# Patient Record
Sex: MALE | Race: WHITE | Employment: FULL TIME | URBAN - METROPOLITAN AREA
[De-identification: names, ages, dates, MRNs, and addresses within clinical notes are randomized per-mention and may not be internally consistent; named-entity substitution may affect disease eponyms.]

---

## 2017-02-23 ENCOUNTER — OFFICE VISIT (OUTPATIENT)
Dept: FAMILY MEDICINE | Facility: CLINIC | Age: 27
End: 2017-02-23
Payer: COMMERCIAL

## 2017-02-23 VITALS
SYSTOLIC BLOOD PRESSURE: 118 MMHG | DIASTOLIC BLOOD PRESSURE: 64 MMHG | OXYGEN SATURATION: 98 % | HEIGHT: 69 IN | HEART RATE: 98 BPM | BODY MASS INDEX: 34.33 KG/M2 | TEMPERATURE: 97.4 F | WEIGHT: 231.8 LBS

## 2017-02-23 DIAGNOSIS — R53.83 FATIGUE, UNSPECIFIED TYPE: ICD-10-CM

## 2017-02-23 DIAGNOSIS — G89.29 CHRONIC PAIN OF BOTH KNEES: Primary | ICD-10-CM

## 2017-02-23 DIAGNOSIS — M25.561 CHRONIC PAIN OF BOTH KNEES: Primary | ICD-10-CM

## 2017-02-23 DIAGNOSIS — M25.562 CHRONIC PAIN OF BOTH KNEES: Primary | ICD-10-CM

## 2017-02-23 DIAGNOSIS — E66.811 OBESITY (BMI 30.0-34.9): ICD-10-CM

## 2017-02-23 PROCEDURE — 99204 OFFICE O/P NEW MOD 45 MIN: CPT | Performed by: INTERNAL MEDICINE

## 2017-02-23 PROCEDURE — 84443 ASSAY THYROID STIM HORMONE: CPT | Performed by: INTERNAL MEDICINE

## 2017-02-23 PROCEDURE — 36415 COLL VENOUS BLD VENIPUNCTURE: CPT | Performed by: INTERNAL MEDICINE

## 2017-02-23 RX ORDER — ALBUTEROL SULFATE 0.83 MG/ML
1 SOLUTION RESPIRATORY (INHALATION) EVERY 6 HOURS PRN
COMMUNITY

## 2017-02-23 RX ORDER — FLUTICASONE PROPIONATE 110 UG/1
1 AEROSOL, METERED RESPIRATORY (INHALATION) 2 TIMES DAILY
COMMUNITY

## 2017-02-23 ASSESSMENT — ANXIETY QUESTIONNAIRES
1. FEELING NERVOUS, ANXIOUS, OR ON EDGE: NOT AT ALL
2. NOT BEING ABLE TO STOP OR CONTROL WORRYING: NOT AT ALL
IF YOU CHECKED OFF ANY PROBLEMS ON THIS QUESTIONNAIRE, HOW DIFFICULT HAVE THESE PROBLEMS MADE IT FOR YOU TO DO YOUR WORK, TAKE CARE OF THINGS AT HOME, OR GET ALONG WITH OTHER PEOPLE: SOMEWHAT DIFFICULT
GAD7 TOTAL SCORE: 4
5. BEING SO RESTLESS THAT IT IS HARD TO SIT STILL: NOT AT ALL
7. FEELING AFRAID AS IF SOMETHING AWFUL MIGHT HAPPEN: NOT AT ALL
6. BECOMING EASILY ANNOYED OR IRRITABLE: SEVERAL DAYS
3. WORRYING TOO MUCH ABOUT DIFFERENT THINGS: SEVERAL DAYS

## 2017-02-23 ASSESSMENT — PATIENT HEALTH QUESTIONNAIRE - PHQ9: 5. POOR APPETITE OR OVEREATING: MORE THAN HALF THE DAYS

## 2017-02-23 NOTE — PROGRESS NOTES
"  SUBJECTIVE:                                                    Jovanni Browne is a 26 year old male who presents to clinic today for the following health issues:    Jovanni is here to establish care.  He was in the [employer] for 5 years, discharge in July.  He currently works for [employer].  He lives with his girlfriend.  He has several concerns today.        Bilateral knee pain x 3 years   -- hurts most days out of the week  -- R>L  -- both swell up, click and pop. Sometimes feel like they are going to give out. Right knee locks up.    -- getting worse, but also has been more active lately   -- he has had a full evaluation including bilateral MRIs and orthopedics evaluation.  Apparently the MRI showed an abnormal plica in the right knee but no pathology in the left knee   -- has done physical therapy which did not help   -- not taking any medications for them now.      Hyperhydrosis   -- using 2 rx strength antiperspirants. Just started last week.  They seem to be helping but his skin is a little irritated.     Wondering about thyroid issues  -- always had issues with his weight   -- feeling tired all the time   -- running a few miles every week and lifting weights, has \"totally changed\" his diet but not losing any weight   -- not sure if he feels depressed, but does feel overwhelmed   -- feeling hungrier than usual, trying to eat more frequent small meals rather than one large meal   -- not sleeping well, gets up early, difficulty falling asleep.    -- no screen time before dinner.     PMH - asthma, well controlled on Flovent   PSH - wrist surgery to remove cyst   FH - brother with schizophrenia   Meds - flovent 110mcg 1 puff BID, albuterol prn  ALL - PCN severe allergy in his grandmother, told he might die if he got penicillin but has never taken it      ROS:  Constitutional, cardiovascular, pulmonary, GI, musculoskeletal, neuro, skin, endocrine and psych systems were reviewed. Positive for intermittent sharp " "twinges of chest wall pain. Otherwise negative unless noted above.      OBJECTIVE:                                                    /64 (BP Location: Right arm, Patient Position: Chair, Cuff Size: Adult Regular)  Pulse 98  Temp 97.4  F (36.3  C) (Tympanic)  Ht 5' 8.75\" (1.746 m)  Wt 231 lb 12.8 oz (105.1 kg)  SpO2 98%  BMI 34.48 kg/m2  Body mass index is 34.48 kg/(m^2).    Gen: well appearing, pleasant young man, no distress  HEENT: PERRL, sclera nonicteric, MMM  Neck: supple, no LAD. thyroid normal to palpation.   Pulm: breathing comfortably, CTAB, no wheezes or rales  CV: RRR, normal S1 and S2, no murmurs  Abd: BS present, soft, nontender, nondistended  Ext: 2+ distal pulses, no LE edema  MSK: knees grossly normal to inspection - no swelling or malalignment. R knee tender to palpation along medial joint line and medial femoral condyle. Full ROM, no effusion appreciated. No ligamentous instability.  R knee tender to palpation along medial joint line. Full ROM, no effusion appreciated. No ligamentous instability      Diagnostic Test Results:  none      ASSESSMENT/PLAN:                                                        1. Chronic pain of both knees  - SPORTS MEDICINE REFERRAL  - can try aleve or ibuprofen as needed for pain     2. Fatigue, unspecified type  - TSH with free T4 reflex - okay to leave voicemail with lab results     3. 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  We will start with above plan, and I will see him back in 2 months or so.         Mary Lou Elias MD  [facility] [place]      "

## 2017-02-23 NOTE — MR AVS SNAPSHOT
After Visit Summary   2/23/2017    Jovanni Browne    MRN: [id number]           Patient Information     Date Of Birth          1990        Visit Information        Provider Department      2/23/2017 2:00 PM Mary Lou Elias MD [facility] [place]        Today's Diagnoses     Chronic pain of both knees    -  1    Fatigue, unspecified type          Care Instructions    Sports medicine referral for the knees. You can take ibuprofen 600-800mg three times daily as needed or Aleve (naproxen) 1-2 tabs twice daily as needed for pain.              Follow-ups after your visit        Additional Services     SPORTS MEDICINE REFERRAL       Your provider has referred you to:  [facility]: [facility] [place] [phone]   [email or web address]    Please be aware that coverage of these services is subject to the terms and limitations of your health insurance plan.  Call member services at your health plan with any benefit or coverage questions.      Please bring the following to your appointment:    >>   Any x-rays, CTs or MRIs which have been performed.  Contact the facility where they were done to arrange for  prior to your scheduled appointment.    >>   List of current medications   >>   This referral request   >>   Any documents/labs given to you for this referral                  Who to contact     If you have questions or need follow up information about today's clinic visit or your schedule please contact [facility] directly at [phone].  Normal or non-critical lab and imaging results will be communicated to you by MyChart, letter or phone within 4 business days after the clinic has received the results. If you do not hear from us within 7 days, please contact the clinic through MyChart or phone. If you have a critical or abnormal lab result, we will notify  "you by phone as soon as possible.  Submit refill requests through [employer] or call your pharmacy and they will forward the refill request to us. Please allow 3 business days for your refill to be completed.          Additional Information About Your Visit        [employer] Information     [employer] lets you send messages to your doctor, view your test results, renew your prescriptions, schedule appointments and more. To sign up, go to [email or web address] . Click on \"Log in\" on the left side of the screen, which will take you to the Welcome page. Then click on \"Sign up Now\" on the right side of the page.     You will be asked to enter the access code listed below, as well as some personal information. Please follow the directions to create your username and password.     Your access code is: [email or web address]  Expires: 2017  2:34 PM     Your access code will  in 90 days. If you need help or a new code, please call your [place] clinic or [phone].        Care EveryWhere ID     This is your Care EveryWhere ID. This could be used by other organizations to access your [facility] medical records  [id number]        Your Vitals Were     Pulse Temperature Height Pulse Oximetry BMI (Body Mass Index)       98 97.4  F (36.3  C) (Tympanic) 5' 8.75\" (1.746 m) 98% 34.48 kg/m2        Blood Pressure from Last 3 Encounters:   17 118/64    Weight from Last 3 Encounters:   17 231 lb 12.8 oz (105.1 kg)              We Performed the Following     SPORTS MEDICINE REFERRAL     TSH with free T4 reflex        Primary Care Provider    None Specified       No primary provider on file.        Thank you!     Thank you for choosing [facility] [place]  for your care. Our goal is always to provide you with excellent care. Hearing back from our patients is one way we can continue to improve our services. Please take a few minutes to complete the written survey that you may receive in the mail after your visit with us. " Thank you!             Your Updated Medication List - Protect others around you: Learn how to safely use, store and throw away your medicines at www.disposemymeds.org.          This list is accurate as of: 2/23/17  2:34 PM.  Always use your most recent med list.                   Brand Name Dispense Instructions for use    albuterol (2.5 MG/3ML) 0.083% neb solution      Take 1 vial by nebulization every 6 hours as needed for shortness of breath / dyspnea or wheezing       fluticasone 110 MCG/ACT Inhaler    FLOVENT HFA     Inhale 1 puff into the lungs 2 times daily

## 2017-02-23 NOTE — PATIENT INSTRUCTIONS
Sports medicine referral for the knees. You can take ibuprofen 600-800mg three times daily as needed or Aleve (naproxen) 1-2 tabs twice daily as needed for pain.

## 2017-02-24 PROBLEM — M25.561 CHRONIC PAIN OF BOTH KNEES: Status: ACTIVE | Noted: 2017-02-24

## 2017-02-24 PROBLEM — G89.29 CHRONIC PAIN OF BOTH KNEES: Status: ACTIVE | Noted: 2017-02-24

## 2017-02-24 PROBLEM — M25.562 CHRONIC PAIN OF BOTH KNEES: Status: ACTIVE | Noted: 2017-02-24

## 2017-02-24 PROBLEM — E66.811 OBESITY (BMI 30.0-34.9): Status: ACTIVE | Noted: 2017-02-24

## 2017-02-24 LAB — TSH SERPL DL<=0.005 MIU/L-ACNC: 3.08 MU/L (ref 0.4–4)

## 2017-02-24 ASSESSMENT — PATIENT HEALTH QUESTIONNAIRE - PHQ9: SUM OF ALL RESPONSES TO PHQ QUESTIONS 1-9: 7

## 2017-02-24 ASSESSMENT — ANXIETY QUESTIONNAIRES: GAD7 TOTAL SCORE: 4

## 2017-02-24 ASSESSMENT — ASTHMA QUESTIONNAIRES: ACT_TOTALSCORE: 22

## 2021-10-20 ENCOUNTER — HOSPITAL ENCOUNTER (EMERGENCY)
Facility: CLINIC | Age: 31
Discharge: HOME | End: 2021-10-20
Attending: EMERGENCY MEDICINE | Admitting: EMERGENCY MEDICINE
Payer: COMMERCIAL

## 2021-10-20 VITALS
HEIGHT: 69 IN | DIASTOLIC BLOOD PRESSURE: 67 MMHG | TEMPERATURE: 99.4 F | OXYGEN SATURATION: 99 % | RESPIRATION RATE: 13 BRPM | WEIGHT: 175 LBS | BODY MASS INDEX: 25.92 KG/M2 | HEART RATE: 78 BPM | SYSTOLIC BLOOD PRESSURE: 113 MMHG

## 2021-10-20 DIAGNOSIS — L03.114 CELLULITIS OF LEFT UPPER EXTREMITY: ICD-10-CM

## 2021-10-20 DIAGNOSIS — R55 SYNCOPE, UNSPECIFIED SYNCOPE TYPE: ICD-10-CM

## 2021-10-20 DIAGNOSIS — I45.10 INCOMPLETE RBBB: ICD-10-CM

## 2021-10-20 LAB
ANION GAP SERPL CALCULATED.3IONS-SCNC: 7 MMOL/L (ref 3–14)
BASOPHILS # BLD AUTO: 0 10E3/UL (ref 0–0.2)
BASOPHILS NFR BLD AUTO: 0 %
BUN SERPL-MCNC: 10 MG/DL (ref 7–30)
CALCIUM SERPL-MCNC: 8.3 MG/DL (ref 8.5–10.1)
CHLORIDE BLD-SCNC: 103 MMOL/L (ref 94–109)
CO2 SERPL-SCNC: 26 MMOL/L (ref 20–32)
CREAT SERPL-MCNC: 0.87 MG/DL (ref 0.66–1.25)
EOSINOPHIL # BLD AUTO: 0.1 10E3/UL (ref 0–0.7)
EOSINOPHIL NFR BLD AUTO: 1 %
ERYTHROCYTE [DISTWIDTH] IN BLOOD BY AUTOMATED COUNT: 11.9 % (ref 10–15)
GFR SERPL CREATININE-BSD FRML MDRD: >90 ML/MIN/1.73M2
GLUCOSE BLD-MCNC: 103 MG/DL (ref 70–99)
HCT VFR BLD AUTO: 38.5 % (ref 40–53)
HGB BLD-MCNC: 13.1 G/DL (ref 13.3–17.7)
HOLD SPECIMEN: NORMAL
IMM GRANULOCYTES # BLD: 0 10E3/UL
IMM GRANULOCYTES NFR BLD: 0 %
LYMPHOCYTES # BLD AUTO: 2 10E3/UL (ref 0.8–5.3)
LYMPHOCYTES NFR BLD AUTO: 18 %
MCH RBC QN AUTO: 30.7 PG (ref 26.5–33)
MCHC RBC AUTO-ENTMCNC: 34 G/DL (ref 31.5–36.5)
MCV RBC AUTO: 90 FL (ref 78–100)
MONOCYTES # BLD AUTO: 1.2 10E3/UL (ref 0–1.3)
MONOCYTES NFR BLD AUTO: 11 %
NEUTROPHILS # BLD AUTO: 7.5 10E3/UL (ref 1.6–8.3)
NEUTROPHILS NFR BLD AUTO: 70 %
NRBC # BLD AUTO: 0 10E3/UL
NRBC BLD AUTO-RTO: 0 /100
PLATELET # BLD AUTO: 147 10E3/UL (ref 150–450)
POTASSIUM BLD-SCNC: 3.2 MMOL/L (ref 3.4–5.3)
RBC # BLD AUTO: 4.27 10E6/UL (ref 4.4–5.9)
SODIUM SERPL-SCNC: 136 MMOL/L (ref 133–144)
TROPONIN I SERPL-MCNC: <0.015 UG/L (ref 0–0.04)
WBC # BLD AUTO: 10.8 10E3/UL (ref 4–11)

## 2021-10-20 PROCEDURE — 99284 EMERGENCY DEPT VISIT MOD MDM: CPT | Mod: 25

## 2021-10-20 PROCEDURE — 80048 BASIC METABOLIC PNL TOTAL CA: CPT | Performed by: EMERGENCY MEDICINE

## 2021-10-20 PROCEDURE — 250N000013 HC RX MED GY IP 250 OP 250 PS 637: Performed by: EMERGENCY MEDICINE

## 2021-10-20 PROCEDURE — 85025 COMPLETE CBC W/AUTO DIFF WBC: CPT | Performed by: EMERGENCY MEDICINE

## 2021-10-20 PROCEDURE — 258N000003 HC RX IP 258 OP 636: Performed by: EMERGENCY MEDICINE

## 2021-10-20 PROCEDURE — 36415 COLL VENOUS BLD VENIPUNCTURE: CPT | Performed by: EMERGENCY MEDICINE

## 2021-10-20 PROCEDURE — 84484 ASSAY OF TROPONIN QUANT: CPT | Performed by: EMERGENCY MEDICINE

## 2021-10-20 PROCEDURE — 93005 ELECTROCARDIOGRAM TRACING: CPT

## 2021-10-20 PROCEDURE — 96360 HYDRATION IV INFUSION INIT: CPT

## 2021-10-20 RX ORDER — CLINDAMYCIN HCL 150 MG
450 CAPSULE ORAL 3 TIMES DAILY
Qty: 63 CAPSULE | Refills: 0 | Status: SHIPPED | OUTPATIENT
Start: 2021-10-20 | End: 2021-10-27

## 2021-10-20 RX ORDER — POTASSIUM CHLORIDE 1500 MG/1
40 TABLET, EXTENDED RELEASE ORAL ONCE
Status: COMPLETED | OUTPATIENT
Start: 2021-10-20 | End: 2021-10-20

## 2021-10-20 RX ADMIN — POTASSIUM CHLORIDE 40 MEQ: 1500 TABLET, EXTENDED RELEASE ORAL at 22:14

## 2021-10-20 RX ADMIN — SODIUM CHLORIDE 1000 ML: 9 INJECTION, SOLUTION INTRAVENOUS at 21:19

## 2021-10-20 ASSESSMENT — MIFFLIN-ST. JEOR: SCORE: 1754.17

## 2021-10-21 LAB
ATRIAL RATE - MUSE: 93 BPM
DIASTOLIC BLOOD PRESSURE - MUSE: NORMAL MMHG
INTERPRETATION ECG - MUSE: NORMAL
P AXIS - MUSE: 44 DEGREES
PR INTERVAL - MUSE: 142 MS
QRS DURATION - MUSE: 106 MS
QT - MUSE: 342 MS
QTC - MUSE: 425 MS
R AXIS - MUSE: 34 DEGREES
SYSTOLIC BLOOD PRESSURE - MUSE: NORMAL MMHG
T AXIS - MUSE: 28 DEGREES
VENTRICULAR RATE- MUSE: 93 BPM

## 2021-10-21 NOTE — ED PROVIDER NOTES
History     Chief Complaint:  Syncope     HPI:  Jovanni Browne is a 30 year old male who presents with syncope via EMS.  Patient reports he first began feeling unwell on Monday.  She received both the influenza vaccine as well as the pneumonia vaccine to his left deltoid on Monday (2 days previous).  Since that time, he has noted feelings of generalized malaise, myalgias, low-grade fevers (nothing greater than 100), as well as chills.  This has resulted in decreased oral intake.  Earlier this evening, he smoked marijuana.  He subsequently was taking a hot shower.  He attempted to move his left arm, though noted acute onset of pain about the left shoulder, radiating throughout his whole body.  He then experienced dizziness, tunnel vision, and slowly lowered himself to the ground.  His wife was present shortly thereafter, and notes he was unconscious for approximately 10 to 15 seconds before regaining full consciousness.  He did not sustain any seizure-like movements, or postictal phase.  No loss of bowel or bladder function is noted.  He does have a history of syncope related to IV starts in the past.  He currently remains asymptomatic.  He denies any prodromal chest pain, palpitations nor shortness of breath.  Denies family history of premature cardiac disease or sudden cardiac death.  No other concerns are voiced at this time.    Allergies:  Penicillins     Medications:    clindamycin (CLEOCIN) 150 MG capsule  albuterol (2.5 MG/3ML) 0.083% neb solution  fluticasone (FLOVENT HFA) 110 MCG/ACT Inhaler      Past Medical History:    Past Medical History:   Diagnosis Date     Uncomplicated asthma      Patient Active Problem List    Diagnosis Date Noted     Chronic pain of both knees 02/24/2017     Priority: Medium     Obesity (BMI 30.0-34.9) 02/24/2017     Priority: Medium        Past Surgical History:    Past Surgical History:   Procedure Laterality Date     WRIST SURGERY      cyst removal        Family History:   "  family history includes Schizophrenia in his brother.    Social History:   reports that he has quit smoking. He has never used smokeless tobacco. He reports current alcohol use. He reports that he does not use drugs.    Review of Systems:  10 point ROS is negative aside from that mentioned in the HPI      Physical Exam     Patient Vitals for the past 24 hrs:   BP Temp Temp src Pulse Resp SpO2 Height Weight   10/20/21 2200 113/67 -- -- 78 13 99 % -- --   10/20/21 2130 114/60 -- -- 88 10 95 % -- --   10/20/21 2100 117/69 -- -- 94 20 97 % -- --   10/20/21 2029 128/81 99.4  F (37.4  C) Oral 94 16 98 % 1.753 m (5' 9\") 79.4 kg (175 lb)      General:   Well-nourished   Speaking in full sentences  Eyes:   Conjunctiva without injection or scleral icterus  ENT:   Moist mucous membranes   Nares patent   Pinnae normal  Neck:   Full ROM   No stiffness appreciated  Resp:   Lungs CTAB   No crackles, wheezing or audible rubs   Good air movement  CV:    Normal rate, regular rhythm   S1 and S2 present   No murmur, gallop or rub  GI:   BS present   Abdomen soft without distention   Non-tender to light and deep palpation   No guarding or rebound tenderness  Skin:   Warm, dry, well perfused   Erythema, warmth and tenderness noted about lateral aspect of shoulder  MSK:   Moves all extremities   No focal deformities or swelling  Neuro:   Alert   Answers questions appropriately   Moves all extremities equally   Gait stable  Psych:   Normal affect, normal mood      Emergency Department Course     ECG  ECG taken at 2030, ECG read at 2040  Normal sinus rhythm. Normal ECG.  Incomplete RBBB  Rate 93 bpm. UT interval 142 ms. QRS duration 106 ms. QT/QTc 342/425 ms. P-R-T axes 44 34 28.       Laboratory:  Laboratory findings were communicated with the patient who voiced understanding of the findings.  Labs Ordered and Resulted from Time of ED Arrival Up to the Time of Departure from the ED   BASIC METABOLIC PANEL - Abnormal; Notable for the " following components:       Result Value    Potassium 3.2 (*)     Calcium 8.3 (*)     Glucose 103 (*)     All other components within normal limits   CBC WITH PLATELETS AND DIFFERENTIAL - Abnormal; Notable for the following components:    RBC Count 4.27 (*)     Hemoglobin 13.1 (*)     Hematocrit 38.5 (*)     Platelet Count 147 (*)     All other components within normal limits   TROPONIN I - Normal   EXTRA BLUE TOP TUBE   EXTRA RED TOP TUBE   EXTRA GREEN TOP (LITHIUM HEPARIN) TUBE   EXTRA PURPLE TOP TUBE   EXTRA TUBE    Narrative:     The following orders were created for panel order [place] Draw.  Procedure                               Abnormality         Status                     ---------                               -----------         ------                     Extra Blue Top Tube[[id number]]                              Final result               Extra Red Top Tube[[id number]]                               Final result               Extra Green Top (Lithium...[[id number]]                      Final result               Extra Purple Top Tube[[id number]]                            Final result                 Please view results for these tests on the individual orders.   CBC WITH PLATELETS & DIFFERENTIAL    Narrative:     The following orders were created for panel order CBC with platelets differential.  Procedure                               Abnormality         Status                     ---------                               -----------         ------                     CBC with platelets and d...[[id number]]  Abnormal            Final result                 Please view results for these tests on the individual orders.        Interventions:  Medications   0.9% sodium chloride BOLUS (0 mLs Intravenous Stopped 10/20/21 2200)   potassium chloride ER (KLOR-CON M) CR tablet 40 mEq (40 mEq Oral Given 10/20/21 2214)        Emergency Department Course / Reassessment:  Nursing notes and vitals reviewed.  8:32 PM: I  performed an exam of the patient as documented above.       Discussed EKG findings with Dr. Lio Centeno     I discussed the treatment plan with the patient and spouse. They expressed understanding of this plan and consented to discharge. They will be discharged home with instructions for care and follow up. In addition, the patient will return to the emergency department if their symptoms persist, worsen, if new symptoms arise or if there is any concern.  All questions were answered.    I personally reviewed the imaging and laboratory results with the Patient and answered all related questions prior to discharge.      Impression & Plan    Medical Decision Making:  Jovanni Browne is a 30 year old male who presents to the ER for evaluation of syncope.  Vital signs on presentation reveal no acute abnormalities.  Differential diagnosis includes, though is not limited to, cardiac causes (arrhythmia, acute coronary syndrome, aortic dissection, aortic stenosis, carotid sinus sensitivity) pulmonary embolism, CVA/TIA, seizure, vasovagal syncope, subarachnoid hemorrhage, intracranial hemorrhage, orthostatic hypotension, hypoglycemia, toxicologic etiologies, and infection.  Based on the above history, and examination, I am most suspicious his syncopal episode was related to vasovagal episode with a possible component of orthostasis.  Patient notes feelings of generalized malaise, chills, and body aches following immunizations 2 days previous.  Just prior to his syncopal episode, he noted an acute episode of pain about his left shoulder, the site of prior immunization.  Immediately following this pain, he developed feelings of dizziness, tunnel vision, before his syncopal episode.  No traumatic injuries were sustained during this, including head injury.  No indication for further advanced imaging or x-rays at this time.  Patient does have a history of syncopal episodes following IV placement in the past.  Decreased oral intake  given generalized malaise and myalgias likely contributing, as well as the hot shower.  His EKG demonstrates sinus rhythm without evidence of acute arrhythmia.  He does have QRS morphology suspicious for incomplete right bundle branch block.  No previous EKGs are available for comparison.  No evidence of preexcitation or prolonged QT interval.  I reviewed the EKG with cardiology, discussing the possibility of Brugada syndrome, though the morphology in V2 is not suggestive of this per cardiology.  Clinical history also much more suggestive of vasovagal etiologies.  No family history of premature cardiac disease or sudden cardiac death.  No other signs or symptoms suggestive of seizure.  Other etiologies such as intracranial hemorrhage, CVA, PE, ACS are also felt to be unlikely.  Here in the ER, patient had returned to baseline, and was ambulatory independently.  He does have evidence of evolving erythema, warmth, and tenderness about his immunization site, that does raise concern for evolving cellulitis.  The region of erythema was outlined.  We will plan discharge home on oral clindamycin (severe penicillin allergy).  Antibiotic side effects discussed and recommendations given for probiotic.  Patient is to return if he develops spreading erythema, pain, fevers, chills or any other concerns.  Otherwise follow-up with PCP.  Return to ER with recurrent fainting episodes or any other concerns.      Diagnosis:    ICD-10-CM    1. Syncope, unspecified syncope type  R55    2. Incomplete RBBB  I45.10    3. Cellulitis of left upper extremity  L03.114         Discharge Medications:  Discharge Medication List as of 10/20/2021 10:42 PM      START taking these medications    Details   clindamycin (CLEOCIN) 150 MG capsule Take 3 capsules (450 mg) by mouth 3 times daily for 7 days, Disp-63 capsule, R-0, Local Print              10/20/2021   Ben Carmona MD Roach, Brian Donald, MD  10/21/21 7999

## 2021-10-21 NOTE — ED TRIAGE NOTES
"Pt presents via EMS with reports that pt was in shower tonight, was feeling \"off.\" Got out and sat down on the floor and his wife witnessed him passing out for a minute. Pt has a remote history of seizures when he was a kid.  "

## 2021-12-04 ENCOUNTER — HEALTH MAINTENANCE LETTER (OUTPATIENT)
Age: 31
End: 2021-12-04

## 2022-09-11 ENCOUNTER — HEALTH MAINTENANCE LETTER (OUTPATIENT)
Age: 32
End: 2022-09-11

## 2023-01-23 ENCOUNTER — HEALTH MAINTENANCE LETTER (OUTPATIENT)
Age: 33
End: 2023-01-23

## 2024-02-24 ENCOUNTER — HEALTH MAINTENANCE LETTER (OUTPATIENT)
Age: 34
End: 2024-02-24